# Patient Record
Sex: FEMALE | Race: OTHER | Employment: UNEMPLOYED | URBAN - METROPOLITAN AREA
[De-identification: names, ages, dates, MRNs, and addresses within clinical notes are randomized per-mention and may not be internally consistent; named-entity substitution may affect disease eponyms.]

---

## 2023-03-21 ENCOUNTER — HOSPITAL ENCOUNTER (EMERGENCY)
Age: 43
Discharge: HOME | End: 2023-03-21

## 2023-03-21 VITALS
RESPIRATION RATE: 16 BRPM | DIASTOLIC BLOOD PRESSURE: 96 MMHG | BODY MASS INDEX: 36.86 KG/M2 | HEART RATE: 88 BPM | WEIGHT: 208 LBS | TEMPERATURE: 97.4 F | HEIGHT: 63 IN | OXYGEN SATURATION: 99 % | SYSTOLIC BLOOD PRESSURE: 151 MMHG

## 2023-03-21 DIAGNOSIS — Z76.0 ENCOUNTER FOR MEDICATION REFILL: ICD-10-CM

## 2023-03-21 DIAGNOSIS — F41.1 ANXIETY STATE: Primary | ICD-10-CM

## 2023-03-21 DIAGNOSIS — F32.A DEPRESSION, UNSPECIFIED DEPRESSION TYPE: ICD-10-CM

## 2023-03-21 PROCEDURE — 99283 EMERGENCY DEPT VISIT LOW MDM: CPT

## 2023-03-21 PROCEDURE — 6370000000 HC RX 637 (ALT 250 FOR IP): Performed by: NURSE PRACTITIONER

## 2023-03-21 RX ORDER — DOXEPIN HYDROCHLORIDE 100 MG/1
100 CAPSULE ORAL DAILY
Qty: 7 CAPSULE | Refills: 0 | Status: SHIPPED | OUTPATIENT
Start: 2023-03-21 | End: 2023-03-28

## 2023-03-21 RX ORDER — CLONAZEPAM 1 MG/1
1 TABLET ORAL 3 TIMES DAILY PRN
COMMUNITY

## 2023-03-21 RX ORDER — DULOXETIN HYDROCHLORIDE 60 MG/1
60 CAPSULE, DELAYED RELEASE ORAL DAILY
Qty: 7 CAPSULE | Refills: 0 | Status: SHIPPED | OUTPATIENT
Start: 2023-03-21 | End: 2023-03-28

## 2023-03-21 RX ORDER — DOXEPIN HYDROCHLORIDE 100 MG/1
150 CAPSULE ORAL EVERY MORNING
COMMUNITY
End: 2023-03-21

## 2023-03-21 RX ORDER — DULOXETIN HYDROCHLORIDE 60 MG/1
60 CAPSULE, DELAYED RELEASE ORAL DAILY
COMMUNITY
End: 2023-03-21

## 2023-03-21 RX ORDER — LORAZEPAM 1 MG/1
1 TABLET ORAL ONCE
Status: COMPLETED | OUTPATIENT
Start: 2023-03-21 | End: 2023-03-21

## 2023-03-21 RX ORDER — RISPERIDONE 0.5 MG/1
0.5 TABLET ORAL NIGHTLY
COMMUNITY
End: 2023-03-21

## 2023-03-21 RX ORDER — CLONAZEPAM 1 MG/1
1 TABLET ORAL 3 TIMES DAILY
Qty: 15 TABLET | Refills: 0 | Status: SHIPPED | OUTPATIENT
Start: 2023-03-21 | End: 2023-03-26

## 2023-03-21 RX ORDER — RISPERIDONE 0.5 MG/1
0.5 TABLET ORAL NIGHTLY
Qty: 7 TABLET | Refills: 0 | Status: SHIPPED | OUTPATIENT
Start: 2023-03-21 | End: 2023-03-28

## 2023-03-21 RX ADMIN — LORAZEPAM 1 MG: 1 TABLET ORAL at 17:05

## 2023-03-21 NOTE — ED PROVIDER NOTES
Independent KIRBY Visit. HPI:  3/21/23,   Time: 7:38 PM EDT         Lan Bolaños is a 41 y.o. female presenting to the ED for medication refill, beginning 2 weeks ago. The complaint has been persistent, mild in severity, and worsened by nothing.  [employer] was used for assistance, and there is a family member present also, she states she moved here from [facility] 3 weeks ago and is now out of her medications. She was following with psychiatry in [place] and is on meds for anxiety and depression, but is now out of the meds. She denies any SI or HI, and she is tearful during exam. She does not have a PCP or Psychiatrist to follow up with. She denies etoh or substance abuse, denies A/V hallucinations and denies physical pain. ROS:   Pertinent positives and negatives are stated within HPI, all other systems reviewed and are negative.  --------------------------------------------- PAST HISTORY ---------------------------------------------  Past Medical History:  has a past medical history of Depression. Past Surgical History:  has no past surgical history on file. Social History:  reports that she has never smoked. She has never used smokeless tobacco. She reports that she does not currently use alcohol. She reports current drug use. Drug: Marijuana Sydna Silk). Family History: family history is not on file. The patients home medications have been reviewed. Allergies: Penicillins    -------------------------------------------------- RESULTS -------------------------------------------------  All laboratory and radiology results have been personally reviewed by myself   LABS:  No results found for this visit on 03/21/23. RADIOLOGY:  Interpreted by Radiologist.  No orders to display       ------------------------- NURSING NOTES AND VITALS REVIEWED ---------------------------   The nursing notes within the ED encounter and vital signs as below have been reviewed.    BP (!) 151/96

## 2023-03-21 NOTE — LETTER
[facility] Emergency Department  [address]  [place] [address]  Phone: [phone]               March 21, 2023    Patient: Elsa Johnson   YOB: 1980   Date of Visit: 3/21/2023       To Whom It May Concern:    Millie Aden was seen and treated in our emergency department on 3/21/2023    Sincerely,           Signature:__________________________________

## 2023-03-27 ENCOUNTER — APPOINTMENT (OUTPATIENT)
Dept: GENERAL RADIOLOGY | Age: 43
End: 2023-03-27
Payer: MEDICAID

## 2023-03-27 LAB
ALBUMIN SERPL-MCNC: 3.3 G/DL (ref 3.5–5.2)
ALP SERPL-CCNC: 64 U/L (ref 35–104)
ALT SERPL-CCNC: 13 U/L (ref 0–32)
AMPHET UR QL SCN: NOT DETECTED
ANION GAP SERPL CALCULATED.3IONS-SCNC: 12 MMOL/L (ref 7–16)
APAP SERPL-MCNC: <5 MCG/ML (ref 10–30)
AST SERPL-CCNC: 15 U/L (ref 0–31)
BACTERIA URNS QL MICRO: ABNORMAL /HPF
BARBITURATES UR QL SCN: NOT DETECTED
BASOPHILS # BLD: 0.04 E9/L (ref 0–0.2)
BASOPHILS NFR BLD: 0.4 % (ref 0–2)
BENZODIAZ UR QL SCN: POSITIVE
BILIRUB SERPL-MCNC: <0.2 MG/DL (ref 0–1.2)
BILIRUB UR QL STRIP: NEGATIVE
BUN SERPL-MCNC: 9 MG/DL (ref 6–20)
CALCIUM SERPL-MCNC: 9.1 MG/DL (ref 8.6–10.2)
CANNABINOIDS UR QL SCN: POSITIVE
CHLORIDE SERPL-SCNC: 108 MMOL/L (ref 98–107)
CLARITY UR: CLEAR
CO2 SERPL-SCNC: 18 MMOL/L (ref 22–29)
COCAINE UR QL SCN: NOT DETECTED
COLOR UR: YELLOW
CREAT SERPL-MCNC: 0.8 MG/DL (ref 0.5–1)
DRUG SCREEN COMMENT UR-IMP: ABNORMAL
EOSINOPHIL # BLD: 0.13 E9/L (ref 0.05–0.5)
EOSINOPHIL NFR BLD: 1.2 % (ref 0–6)
EPI CELLS #/AREA URNS HPF: ABNORMAL /HPF
ERYTHROCYTE [DISTWIDTH] IN BLOOD BY AUTOMATED COUNT: 14.3 FL (ref 11.5–15)
ETHANOLAMINE SERPL-MCNC: <10 MG/DL (ref 0–0.08)
FENTANYL SCREEN, URINE: NOT DETECTED
GLUCOSE SERPL-MCNC: 76 MG/DL (ref 74–99)
GLUCOSE UR STRIP-MCNC: NEGATIVE MG/DL
HCG, URINE, POC: NEGATIVE
HCT VFR BLD AUTO: 44.8 % (ref 34–48)
HGB BLD-MCNC: 13.5 G/DL (ref 11.5–15.5)
HGB UR QL STRIP: ABNORMAL
IMM GRANULOCYTES # BLD: 0.02 E9/L
IMM GRANULOCYTES NFR BLD: 0.2 % (ref 0–5)
KETONES UR STRIP-MCNC: NEGATIVE MG/DL
LEUKOCYTE ESTERASE UR QL STRIP: NEGATIVE
LYMPHOCYTES # BLD: 4.35 E9/L (ref 1.5–4)
LYMPHOCYTES NFR BLD: 39.3 % (ref 20–42)
Lab: NORMAL
MCH RBC QN AUTO: 27 PG (ref 26–35)
MCHC RBC AUTO-ENTMCNC: 30.1 % (ref 32–34.5)
MCV RBC AUTO: 89.6 FL (ref 80–99.9)
METHADONE UR QL SCN: NOT DETECTED
MONOCYTES # BLD: 0.59 E9/L (ref 0.1–0.95)
MONOCYTES NFR BLD: 5.3 % (ref 2–12)
NEGATIVE QC PASS/FAIL: NORMAL
NEUTROPHILS # BLD: 5.93 E9/L (ref 1.8–7.3)
NEUTS SEG NFR BLD: 53.6 % (ref 43–80)
NITRITE UR QL STRIP: NEGATIVE
OPIATES UR QL SCN: NOT DETECTED
OXYCODONE URINE: NOT DETECTED
PCP UR QL SCN: NOT DETECTED
PH UR STRIP: 6 [PH] (ref 5–9)
PLATELET # BLD AUTO: 364 E9/L (ref 130–450)
PMV BLD AUTO: 9.5 FL (ref 7–12)
POSITIVE QC PASS/FAIL: NORMAL
POTASSIUM SERPL-SCNC: 4 MMOL/L (ref 3.5–5)
PROT SERPL-MCNC: 7.1 G/DL (ref 6.4–8.3)
PROT UR STRIP-MCNC: NEGATIVE MG/DL
RBC # BLD AUTO: 5 E12/L (ref 3.5–5.5)
RBC #/AREA URNS HPF: ABNORMAL /HPF (ref 0–2)
SALICYLATES SERPL-MCNC: 1.8 MG/DL (ref 0–30)
SODIUM SERPL-SCNC: 138 MMOL/L (ref 132–146)
SP GR UR STRIP: 1.02 (ref 1–1.03)
TRICYCLIC ANTIDEPRESSANTS SCREEN SERUM: NEGATIVE NG/ML
UROBILINOGEN UR STRIP-ACNC: 0.2 E.U./DL
WBC # BLD: 11.1 E9/L (ref 4.5–11.5)
WBC #/AREA URNS HPF: ABNORMAL /HPF (ref 0–5)

## 2023-03-27 PROCEDURE — 81001 URINALYSIS AUTO W/SCOPE: CPT

## 2023-03-27 PROCEDURE — 93005 ELECTROCARDIOGRAM TRACING: CPT | Performed by: NURSE PRACTITIONER

## 2023-03-27 PROCEDURE — 85025 COMPLETE CBC W/AUTO DIFF WBC: CPT

## 2023-03-27 PROCEDURE — 80307 DRUG TEST PRSMV CHEM ANLYZR: CPT

## 2023-03-27 PROCEDURE — 80053 COMPREHEN METABOLIC PANEL: CPT

## 2023-03-27 PROCEDURE — 80179 DRUG ASSAY SALICYLATE: CPT

## 2023-03-27 PROCEDURE — 71045 X-RAY EXAM CHEST 1 VIEW: CPT

## 2023-03-27 PROCEDURE — 99285 EMERGENCY DEPT VISIT HI MDM: CPT

## 2023-03-27 PROCEDURE — 80143 DRUG ASSAY ACETAMINOPHEN: CPT

## 2023-03-27 PROCEDURE — 82077 ASSAY SPEC XCP UR&BREATH IA: CPT

## 2023-03-27 ASSESSMENT — PAIN - FUNCTIONAL ASSESSMENT: PAIN_FUNCTIONAL_ASSESSMENT: NONE - DENIES PAIN

## 2023-03-27 ASSESSMENT — LIFESTYLE VARIABLES
HOW MANY STANDARD DRINKS CONTAINING ALCOHOL DO YOU HAVE ON A TYPICAL DAY: PATIENT DOES NOT DRINK
HOW OFTEN DO YOU HAVE A DRINK CONTAINING ALCOHOL: NEVER

## 2023-03-27 NOTE — ED PROVIDER NOTES
[facility]  ED  Encounter Note  Admit Date/RoomTime: No admission date for patient encounter. ED Room: [place] [address]    Department of Emergency Medicine  ED Provider Note  Admit Date: 3/28/2023  Room: [place] [address]    Independent      3/27/23  6:36 PM EDT    HPI: Reese Madrid 41 y.o. female presents with a complaint of increased anxiety beginning days to weeks ago. Complaint has been constant and became more severe today which is what prompted the visit. Denies suicidal ideation. Denies homicidal ideation. No specific plan. Anxiety (Here recently came back in for more medication, states she called office but cant be seen )  Patient presents with increasing anxiety and inability to sleep. Patient was actually just here March 21, at that time she was provided with 4 refill prescriptions of her psychiatric meds. She reports that she tried to call them for an appointment but never got a hold of anyone and still they did not call her back so she has not yet been seen. Patient presents from [facility], does have anxiety. Patient does not any homicidal or suicidal ideations as well as no associated hallucinations reports increased anxiety and inability to sleep. Denies any specific triggers. Patient also denies any drug or alcohol use.  utilized for translation. Review of Systems:   Pertinent positives and negatives are stated within HPI, all other systems reviewed and are negative.      --------------------------------------------- PAST HISTORY ---------------------------------------------  Past Medical History:  has a past medical history of Depression. Past Surgical History:  has no past surgical history on file. Social History:  reports that she has never smoked. She has never used smokeless tobacco. She reports that she does not currently use alcohol. She reports current drug use.  Drug: Marijuana

## 2023-03-27 NOTE — ED NOTES
Department of Emergency Medicine  FIRST PROVIDER TRIAGE NOTE             Independent MLP           3/27/23  6:33 PM EDT    Date of Encounter: 3/27/23   MRN: [id number]      HPI: Elzie Homans is a 41 y.o. female who presents to the ED for Anxiety (Here recently came back in for more medication, states she called office but cant be seen )  Patient presents with increasing anxiety and inability to sleep. Patient was actually just here March 21, at that time she was provided with 4 refill prescriptions of her psychiatric meds. She reports that she tried to call them for an appointment but never got a hold of anyone and still they did not call her back so she has not yet been seen. Patient presents from [facility], does have anxiety. Patient does not any homicidal or suicidal ideations as well as no associated hallucinations reports increased anxiety and inability to sleep. Denies any specific triggers. ROS: Negative for cp or sob. PE: Gen Appearance/Constitutional: alert  HEENT: NC/NT. PERRLA,  Airway patent. Initial Plan of Care: All treatment areas with department are currently occupied.  Plan to order/Initiate the following while awaiting opening in ED: labs, EKG, and Social Work Eval.  Initiate Treatment-Testing, Proceed toTreatment Area When [facility] Available for ED Attending/MLP to [employer] signed by DARWIN Barrett CNP   DD: 3/27/23      DARWIN Barrett CNP  03/27/23 [id number]

## 2023-03-28 ENCOUNTER — HOSPITAL ENCOUNTER (EMERGENCY)
Age: 43
Discharge: HOME | End: 2023-03-28
Payer: MEDICAID

## 2023-03-28 VITALS
TEMPERATURE: 97.3 F | DIASTOLIC BLOOD PRESSURE: 90 MMHG | SYSTOLIC BLOOD PRESSURE: 125 MMHG | OXYGEN SATURATION: 98 % | HEART RATE: 66 BPM | RESPIRATION RATE: 16 BRPM

## 2023-03-28 DIAGNOSIS — F41.9 ANXIETY: Primary | ICD-10-CM

## 2023-03-28 LAB
EKG ATRIAL RATE: 73 BPM
EKG P AXIS: 37 DEGREES
EKG P-R INTERVAL: 146 MS
EKG Q-T INTERVAL: 382 MS
EKG QRS DURATION: 100 MS
EKG QTC CALCULATION (BAZETT): 420 MS
EKG R AXIS: 62 DEGREES
EKG T AXIS: 24 DEGREES
EKG VENTRICULAR RATE: 73 BPM

## 2023-03-28 PROCEDURE — 93010 ELECTROCARDIOGRAM REPORT: CPT | Performed by: INTERNAL MEDICINE

## 2023-03-28 RX ORDER — HYDROXYZINE PAMOATE 25 MG/1
25 CAPSULE ORAL 3 TIMES DAILY PRN
Qty: 10 CAPSULE | Refills: 0 | Status: SHIPPED | OUTPATIENT
Start: 2023-03-28 | End: 2023-04-11

## 2023-03-28 ASSESSMENT — PAIN - FUNCTIONAL ASSESSMENT: PAIN_FUNCTIONAL_ASSESSMENT: NONE - DENIES PAIN

## 2023-03-28 ASSESSMENT — LIFESTYLE VARIABLES: HOW OFTEN DO YOU HAVE A DRINK CONTAINING ALCOHOL: NEVER

## 2023-03-28 NOTE — ED NOTES
Behavioral Health Crisis Assessment    JOSE CARLOS BARRERA used     Chief Complaint[de-identified] Patient reports she is ran out out of medications and her appointment is not for 2 more weeks    Mental Status Exam: Patient is alert and oriented x 4, normal affect, Mood is calm and cooperative, Speech is clear/normal, Hygiene is well groomed, Eye contact is normal, Thoughts appear as normal. Patient denies SI, HI and hallucinations. Legal Status:  [x] Voluntary:  [] Involuntary, Issued by:    Gender:  [] Male [x] Female [] Transgender  [] Other    Sexual Orientation:  [x] Heterosexual [] Homosexual [] Bisexual [] Other    Brief Clinical Summary: Per Triage: Here recently came back in for more medication, states she called office but cant be seen     JOSE CARLOS BARRERA met with patient using  to complete a biopsychosocial assessment Patient is a 42 yo [address] speaking only female who is returning to the ED due to her the medications that were prescribed to her here ran out before  her appointment with [facility], appointment is not until April 11th and she can not go that long without medications. Patient reports that she only called [facility] and not the other phone number that was provider too her to see if she could get into any earlier appointment. Patient informed she should ry the other number provided and also the additional number given. Patient reports a mental health hx of  anxiety, nerviousness and trouble sleeping. Patient was given Ativan in the ED and also a prescription for Klonopin. Patient was suppose to follow up with [facility]. Patient denies alcohol and drug use    Collateral Information: No collateral information obtained at this time    Risk Factors:    Mental health dx of anxiety  Recent med change?   Off prescribed psych meds  Social Determinants Significantly Affecting Health : language barrier  Environmental stressors    Protective Factors:    Initiated this ED visit  Help seeking 22-Aug-2022 19:50

## 2023-03-28 NOTE — ED NOTES
Pt calm and in ST #1, Pt family at chairside  And aware of waiting for  whom is coming next to see pt. No needs voiced at this time.      Kamala Jang LPN  76/52/04 [id number]

## 2023-03-28 NOTE — Clinical Note
Norma Humphrey was seen and treated in our emergency department on 3/27/2023.  She may return to work on 03/28/2023.  Please excuse patients Family member, Louise Shaw,  she is to return her next scheduled shift      If you have any questions or concerns, please don't hesitate to call.      Maria Del Rosario Lorenzo, APRN - CNP